# Patient Record
Sex: FEMALE | Race: WHITE | NOT HISPANIC OR LATINO
[De-identification: names, ages, dates, MRNs, and addresses within clinical notes are randomized per-mention and may not be internally consistent; named-entity substitution may affect disease eponyms.]

---

## 2017-12-23 ENCOUNTER — TRANSCRIPTION ENCOUNTER (OUTPATIENT)
Age: 37
End: 2017-12-23

## 2018-03-28 ENCOUNTER — TRANSCRIPTION ENCOUNTER (OUTPATIENT)
Age: 38
End: 2018-03-28

## 2018-10-05 ENCOUNTER — TRANSCRIPTION ENCOUNTER (OUTPATIENT)
Age: 38
End: 2018-10-05

## 2018-12-05 ENCOUNTER — TRANSCRIPTION ENCOUNTER (OUTPATIENT)
Age: 38
End: 2018-12-05

## 2019-11-08 ENCOUNTER — TRANSCRIPTION ENCOUNTER (OUTPATIENT)
Age: 39
End: 2019-11-08

## 2021-02-03 PROBLEM — Z00.00 ENCOUNTER FOR PREVENTIVE HEALTH EXAMINATION: Status: ACTIVE | Noted: 2021-02-03

## 2021-02-10 ENCOUNTER — APPOINTMENT (OUTPATIENT)
Dept: ORTHOPEDIC SURGERY | Facility: CLINIC | Age: 41
End: 2021-02-10
Payer: COMMERCIAL

## 2021-02-10 VITALS — HEIGHT: 62 IN | BODY MASS INDEX: 23.74 KG/M2 | WEIGHT: 129 LBS

## 2021-02-10 DIAGNOSIS — Z78.9 OTHER SPECIFIED HEALTH STATUS: ICD-10-CM

## 2021-02-10 DIAGNOSIS — Z82.61 FAMILY HISTORY OF ARTHRITIS: ICD-10-CM

## 2021-02-10 DIAGNOSIS — Z80.9 FAMILY HISTORY OF MALIGNANT NEOPLASM, UNSPECIFIED: ICD-10-CM

## 2021-02-10 PROCEDURE — 99072 ADDL SUPL MATRL&STAF TM PHE: CPT

## 2021-02-10 PROCEDURE — 99203 OFFICE O/P NEW LOW 30 MIN: CPT

## 2021-02-10 NOTE — HISTORY OF PRESENT ILLNESS
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 Pain occurred while training. She reports she is on several weeks of rest without relief. She is taking Voltaren with significant improvement she has been on a daily basis and feels that she's unable to train at this time because of pain. She denies any numbness or tingling she denies any back pain she denies any radiation of symptoms\par \par The patient's past medical history, past surgical history, medications, allergies, and social history were reviewed by me today with the patient and documented accordingly. In addition, the patient's family history, which is noncontributory to this visit, was also reviewed.\par

## 2021-02-10 NOTE — PHYSICAL EXAM
[de-identified] : General Exam\par \par Well developed, well nourished\par No apparent distress\par Oriented to person, place, and time\par Mood: Normal\par Affect: Normal\par Balance and coordination: Normal\par Gait: Normal \par \par Bilateral hip exam\par Skin: Clean/dry and intact\par Inspection: No obvious deformity, no swelling, no ecchymosis.\par Tenderness: No tenderness over greater trochanter/glut medius insertion. No tenderness pubic symphysis, pubic tubercle, hip flexors. + bilateral ttp ischial tuberosity or buttock. No ttp over the ASIS/Illiac crest.\par ROM 0-120 no pain er or ir\par Additional tests: Negative impingement test, no pain with circumduction +pain w resisted hip ext b/l\par Strength: 5/5 hip flexion/ADD/ABD/Q/H/TA/GS/EHL\par Neuro: Sensation in tact to light touch throughout in dp/sp/tib/grisel/saph distributions\par Pulses: 2+ DP/PT pulses\par \par \par  [de-identified] : Report of an MRI from [facility] imaging imaging with right hamstring tendinosis no tear\par \par

## 2021-02-10 NOTE — DISCUSSION/SUMMARY
[de-identified] : Bilateral hamstring tendinosis. Recommend physical therapy. Ultrasound that a corticosteroid injection bilateral hamstring origin we discussed his condition at length as a product of overuse and discussed the importance of proper therapy and rehabilitation she expressed understanding.

## 2021-03-09 ENCOUNTER — OUTPATIENT (OUTPATIENT)
Dept: OUTPATIENT SERVICES | Facility: HOSPITAL | Age: 41
LOS: 1 days | End: 2021-03-09
Payer: COMMERCIAL

## 2021-03-09 ENCOUNTER — RESULT REVIEW (OUTPATIENT)
Age: 41
End: 2021-03-09

## 2021-03-09 ENCOUNTER — APPOINTMENT (OUTPATIENT)
Dept: ULTRASOUND IMAGING | Facility: CLINIC | Age: 41
End: 2021-03-09

## 2021-03-09 DIAGNOSIS — M76.891 OTHER SPECIFIED ENTHESOPATHIES OF RIGHT LOWER LIMB, EXCLUDING FOOT: ICD-10-CM

## 2021-03-09 PROCEDURE — 20611 DRAIN/INJ JOINT/BURSA W/US: CPT

## 2021-03-09 PROCEDURE — 20611 DRAIN/INJ JOINT/BURSA W/US: CPT | Mod: LT

## 2022-03-30 ENCOUNTER — APPOINTMENT (OUTPATIENT)
Dept: ORTHOPEDIC SURGERY | Facility: CLINIC | Age: 42
End: 2022-03-30

## 2022-09-16 ENCOUNTER — APPOINTMENT (OUTPATIENT)
Dept: ORTHOPEDIC SURGERY | Facility: CLINIC | Age: 42
End: 2022-09-16

## 2022-09-16 PROCEDURE — 99213 OFFICE O/P EST LOW 20 MIN: CPT

## 2022-09-16 NOTE — HISTORY OF PRESENT ILLNESS
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 Pain occurred while training. She reports she is on several weeks of rest without relief. She is taking Voltaren with significant improvement she has been on a daily basis and feels that she's unable to train at this time because of pain. She denies any numbness or tingling she denies any back pain she denies any radiation of symptoms\par \par Since her last visit she underwent ultrasound-guided injections to the proximal hamstring with significant temporary relief.  Her pain is since returned she is currently marathon training.  She is been doing physical therapy exercises and taking ibuprofen for the last 2 months without relief

## 2022-09-16 NOTE — PHYSICAL EXAM
[de-identified] : Bilateral hip exam\par Skin: Clean/dry and intact\par Inspection: No obvious deformity, no swelling, no ecchymosis.\par Tenderness: No tenderness over greater trochanter/glut medius insertion. No tenderness pubic symphysis, pubic tubercle, hip flexors. + bilateral ttp ischial tuberosity or buttock. No ttp over the ASIS/Illiac crest.\par ROM 0-120 no pain er or ir\par Additional tests: Negative impingement test, no pain with circumduction +pain w resisted hip ext b/l\par Strength: 5/5 hip flexion/ADD/ABD/Q/H/TA/GS/EHL\par Neuro: Sensation in tact to light touch throughout in dp/sp/tib/grisel/saph distributions\par Pulses: 2+ DP/PT pulses [de-identified] : Prior x-rays and MRI from 2020 were reviewed.  Hip joint spaces are well-maintained.  There is mild proximal hamstring tendinopathy at that time

## 2022-09-16 NOTE — DISCUSSION/SUMMARY
[de-identified] : 42-year-old female with bilateral proximal hamstring tendinopathy.  She had an MRI 2 years ago and underwent corticosteroid injections with some relief at that time her pain is since worsened she has pain bilaterally at the ischial tuberosity and pain with resisted hip extension.  She is had extensive conservative care consisting of greater than 6 weeks of physical therapy and anti-inflammatory medications.  We will obtain an MRI of the bony pelvis to assess the proximal hamstring she will follow-up after MRI

## 2022-09-27 ENCOUNTER — TRANSCRIPTION ENCOUNTER (OUTPATIENT)
Age: 42
End: 2022-09-27

## 2022-09-27 ENCOUNTER — OUTPATIENT (OUTPATIENT)
Dept: OUTPATIENT SERVICES | Facility: HOSPITAL | Age: 42
LOS: 1 days | End: 2022-09-27
Payer: COMMERCIAL

## 2022-09-27 ENCOUNTER — APPOINTMENT (OUTPATIENT)
Dept: MRI IMAGING | Facility: CLINIC | Age: 42
End: 2022-09-27

## 2022-09-27 DIAGNOSIS — Z00.8 ENCOUNTER FOR OTHER GENERAL EXAMINATION: ICD-10-CM

## 2022-09-27 PROCEDURE — 72195 MRI PELVIS W/O DYE: CPT | Mod: 26

## 2022-09-27 PROCEDURE — 72195 MRI PELVIS W/O DYE: CPT

## 2022-10-02 ENCOUNTER — NON-APPOINTMENT (OUTPATIENT)
Age: 42
End: 2022-10-02

## 2022-10-03 ENCOUNTER — NON-APPOINTMENT (OUTPATIENT)
Age: 42
End: 2022-10-03

## 2022-10-10 ENCOUNTER — APPOINTMENT (OUTPATIENT)
Dept: ORTHOPEDIC SURGERY | Facility: CLINIC | Age: 42
End: 2022-10-10

## 2022-10-10 PROCEDURE — 20611 DRAIN/INJ JOINT/BURSA W/US: CPT | Mod: LT

## 2022-10-10 PROCEDURE — 99204 OFFICE O/P NEW MOD 45 MIN: CPT | Mod: 25

## 2022-11-08 RX ORDER — DICLOFENAC SODIUM 75 MG/1
75 TABLET, DELAYED RELEASE ORAL TWICE DAILY
Qty: 60 | Refills: 2 | Status: ACTIVE | COMMUNITY
Start: 2022-10-03 | End: 1900-01-01

## 2022-12-12 ENCOUNTER — APPOINTMENT (OUTPATIENT)
Dept: MRI IMAGING | Facility: CLINIC | Age: 42
End: 2022-12-12

## 2022-12-12 ENCOUNTER — OUTPATIENT (OUTPATIENT)
Dept: OUTPATIENT SERVICES | Facility: HOSPITAL | Age: 42
LOS: 1 days | End: 2022-12-12
Payer: COMMERCIAL

## 2022-12-12 DIAGNOSIS — M79.605 PAIN IN LEFT LEG: ICD-10-CM

## 2022-12-12 DIAGNOSIS — M79.604 PAIN IN RIGHT LEG: ICD-10-CM

## 2022-12-12 PROCEDURE — 73721 MRI JNT OF LWR EXTRE W/O DYE: CPT

## 2022-12-12 PROCEDURE — 73721 MRI JNT OF LWR EXTRE W/O DYE: CPT | Mod: 26,LT

## 2022-12-12 PROCEDURE — 73721 MRI JNT OF LWR EXTRE W/O DYE: CPT | Mod: 26,RT,76

## 2023-01-13 ENCOUNTER — APPOINTMENT (OUTPATIENT)
Dept: ORTHOPEDIC SURGERY | Facility: CLINIC | Age: 43
End: 2023-01-13
Payer: COMMERCIAL

## 2023-01-13 VITALS — HEIGHT: 62 IN | BODY MASS INDEX: 21.71 KG/M2 | WEIGHT: 118 LBS

## 2023-01-13 DIAGNOSIS — M76.892 OTHER SPECIFIED ENTHESOPATHIES OF LEFT LOWER LIMB, EXCLUDING FOOT: ICD-10-CM

## 2023-01-13 DIAGNOSIS — M76.891 OTHER SPECIFIED ENTHESOPATHIES OF RIGHT LOWER LIMB, EXCLUDING FOOT: ICD-10-CM

## 2023-01-13 PROCEDURE — 99213 OFFICE O/P EST LOW 20 MIN: CPT

## 2023-01-13 NOTE — PHYSICAL EXAM
[de-identified] : Left hip exam\par \par Skin: Clean/dry and intact\par Inspection: No obvious deformity, no swelling, no ecchymosis.\par Tenderness: + tenderness over greater trochanter/glut medius insertion. No tenderness pubic symphysis, pubic tubercle, hip flexors. + ttp ischial tuberosity or buttock. No ttp over the ASIS/Illiac crest.\par ROM: 0-120°. Internal rotation 30 external rotation 70\par Painful ROM: None\par Additional tests: No pain with circumduction negative impingement test at 90° mildly positive impingement test at 60° negative Jordnaa negative Stinchfield significant pain with resisted hip extension\par Strength: 5/5 hip flexion/ADD/ABD/Q/H/TA/GS/EHL\par Neuro: Sensation in tact to light touch throughout in dp/sp/tib/grisel/saph distributions\par Pulses: 2+ DP/PT pulses\par \par Right hip exam\par \par Skin: Clean/dry and intact\par Inspection: No obvious deformity, no swelling, no ecchymosis.\par Tenderness: + tenderness over greater trochanter/glut medius insertion. No tenderness pubic symphysis, pubic tubercle, hip flexors. + ttp ischial tuberosity or buttock. No ttp over the ASIS/Illiac crest.\par ROM: 0-120°. Internal rotation 30 external rotation 70\par Painful ROM: None\par Additional tests: No pain with circumduction negative impingement test at 90° mildly positive impingement test at 60° negative Jordana negative Stinchfield significant pain with resisted hip extension\par Strength: 5/5 hip flexion/ADD/ABD/Q/H/TA/GS/EHL\par Neuro: Sensation in tact to light touch throughout in dp/sp/tib/grisel/saph distributions\par Pulses: 2+ DP/PT pulses [de-identified] : Prior MRI of the pelvis was again reviewed.  MRIs of the right hip and left hip obtained since her last visit were reviewed as well

## 2023-01-13 NOTE — DISCUSSION/SUMMARY
[de-identified] : Bilateral hamstring and gluteal tendinopathy.  We discussed treatment options at length.  This is chronic degenerative condition related to her training and overuse pattern of injury.  She is scheduled for stem cell treatment outside.  We did discuss limited data to support this however minimal risk approach other than cost.  We did discuss the possibility of referral for endoscopic hamstring debridement if symptoms persist.  All of her questions were answered

## 2023-01-13 NOTE — HISTORY OF PRESENT ILLNESS
[de-identified] : 42-year-old female previously seen for hamstring tendinopathy.  She underwent a corticosteroid injection prior to running a marathon.  Her pain has been worse since running the marathon she reports severe pain in her buttock radiating to both legs to the posterior knee.  No pain distal with no numbness tingling.  She reports pain with flexing her knee and extending her hip consistent with hamstring pathology.  She repeat MRIs that were done.  She is currently seeking [facility] consultation evaluation at a place in [place].  She is scheduled for possible stem cells and PRP

## 2023-04-13 ENCOUNTER — NON-APPOINTMENT (OUTPATIENT)
Age: 43
End: 2023-04-13